# Patient Record
Sex: MALE | URBAN - METROPOLITAN AREA
[De-identification: names, ages, dates, MRNs, and addresses within clinical notes are randomized per-mention and may not be internally consistent; named-entity substitution may affect disease eponyms.]

---

## 2020-03-11 ENCOUNTER — HOSPITAL ENCOUNTER (OUTPATIENT)
Age: 60
Setting detail: SPECIMEN
Discharge: HOME | End: 2020-03-11
Payer: COMMERCIAL

## 2020-03-16 LAB — SURGICAL PATHOLOGY REPORT: NORMAL

## 2024-11-15 ENCOUNTER — HOSPITAL ENCOUNTER (OUTPATIENT)
Age: 64
Setting detail: OUTPATIENT SURGERY
Discharge: HOME | End: 2024-11-15
Attending: INTERNAL MEDICINE | Admitting: INTERNAL MEDICINE
Payer: MEDICARE

## 2024-11-15 VITALS
RESPIRATION RATE: 14 BRPM | HEART RATE: 70 BPM | TEMPERATURE: 97.8 F | SYSTOLIC BLOOD PRESSURE: 118 MMHG | DIASTOLIC BLOOD PRESSURE: 56 MMHG | OXYGEN SATURATION: 97 % | WEIGHT: 223 LBS | HEIGHT: 71 IN | BODY MASS INDEX: 31.22 KG/M2

## 2024-11-15 DIAGNOSIS — I49.3 FREQUENT PVCS: ICD-10-CM

## 2024-11-15 DIAGNOSIS — I49.3 PREMATURE VENTRICULAR CONTRACTIONS: Primary | ICD-10-CM

## 2024-11-15 LAB — ECHO BSA: 2.25 M2

## 2024-11-15 PROCEDURE — 93005 ELECTROCARDIOGRAM TRACING: CPT | Performed by: INTERNAL MEDICINE

## 2024-11-15 PROCEDURE — 2580000003 HC RX 258: Performed by: INTERNAL MEDICINE

## 2024-11-15 PROCEDURE — 7100000010 HC PHASE II RECOVERY - FIRST 15 MIN: Performed by: INTERNAL MEDICINE

## 2024-11-15 PROCEDURE — 99153 MOD SED SAME PHYS/QHP EA: CPT | Performed by: INTERNAL MEDICINE

## 2024-11-15 PROCEDURE — 2709999900 HC NON-CHARGEABLE SUPPLY: Performed by: INTERNAL MEDICINE

## 2024-11-15 PROCEDURE — 2500000003 HC RX 250 WO HCPCS: Performed by: INTERNAL MEDICINE

## 2024-11-15 PROCEDURE — C1732 CATH, EP, DIAG/ABL, 3D/VECT: HCPCS | Performed by: INTERNAL MEDICINE

## 2024-11-15 PROCEDURE — C1766 INTRO/SHEATH,STRBLE,NON-PEEL: HCPCS | Performed by: INTERNAL MEDICINE

## 2024-11-15 PROCEDURE — C1730 CATH, EP, 19 OR FEW ELECT: HCPCS | Performed by: INTERNAL MEDICINE

## 2024-11-15 PROCEDURE — 93654 COMPRE EP EVAL TX VT: CPT | Performed by: INTERNAL MEDICINE

## 2024-11-15 PROCEDURE — C1894 INTRO/SHEATH, NON-LASER: HCPCS | Performed by: INTERNAL MEDICINE

## 2024-11-15 PROCEDURE — 7100000011 HC PHASE II RECOVERY - ADDTL 15 MIN: Performed by: INTERNAL MEDICINE

## 2024-11-15 PROCEDURE — 99152 MOD SED SAME PHYS/QHP 5/>YRS: CPT | Performed by: INTERNAL MEDICINE

## 2024-11-15 PROCEDURE — 6360000002 HC RX W HCPCS: Performed by: INTERNAL MEDICINE

## 2024-11-15 RX ORDER — SODIUM CHLORIDE 9 MG/ML
INJECTION, SOLUTION INTRAVENOUS CONTINUOUS
Status: DISCONTINUED | OUTPATIENT
Start: 2024-11-15 | End: 2024-11-15

## 2024-11-15 RX ORDER — ACETAMINOPHEN 325 MG/1
650 TABLET ORAL EVERY 4 HOURS PRN
Status: DISCONTINUED | OUTPATIENT
Start: 2024-11-15 | End: 2024-11-15 | Stop reason: HOSPADM

## 2024-11-15 RX ORDER — FENTANYL CITRATE 50 UG/ML
INJECTION, SOLUTION INTRAMUSCULAR; INTRAVENOUS PRN
Status: DISCONTINUED | OUTPATIENT
Start: 2024-11-15 | End: 2024-11-15 | Stop reason: HOSPADM

## 2024-11-15 RX ORDER — SODIUM CHLORIDE 0.9 % (FLUSH) 0.9 %
5-40 SYRINGE (ML) INJECTION EVERY 12 HOURS SCHEDULED
Status: DISCONTINUED | OUTPATIENT
Start: 2024-11-15 | End: 2024-11-15 | Stop reason: HOSPADM

## 2024-11-15 RX ORDER — ONDANSETRON 2 MG/ML
4 INJECTION INTRAMUSCULAR; INTRAVENOUS ONCE
Status: COMPLETED | OUTPATIENT
Start: 2024-11-15 | End: 2024-11-15

## 2024-11-15 RX ORDER — SODIUM CHLORIDE 9 MG/ML
INJECTION, SOLUTION INTRAVENOUS PRN
Status: DISCONTINUED | OUTPATIENT
Start: 2024-11-15 | End: 2024-11-15 | Stop reason: HOSPADM

## 2024-11-15 RX ORDER — BUPIVACAINE HYDROCHLORIDE 5 MG/ML
INJECTION, SOLUTION EPIDURAL; INTRACAUDAL PRN
Status: DISCONTINUED | OUTPATIENT
Start: 2024-11-15 | End: 2024-11-15 | Stop reason: HOSPADM

## 2024-11-15 RX ORDER — MIDAZOLAM HYDROCHLORIDE 1 MG/ML
INJECTION, SOLUTION INTRAMUSCULAR; INTRAVENOUS PRN
Status: DISCONTINUED | OUTPATIENT
Start: 2024-11-15 | End: 2024-11-15 | Stop reason: HOSPADM

## 2024-11-15 RX ORDER — SODIUM CHLORIDE 0.9 % (FLUSH) 0.9 %
5-40 SYRINGE (ML) INJECTION PRN
Status: DISCONTINUED | OUTPATIENT
Start: 2024-11-15 | End: 2024-11-15 | Stop reason: HOSPADM

## 2024-11-15 RX ORDER — OXYCODONE HYDROCHLORIDE 5 MG/1
5 TABLET ORAL EVERY 4 HOURS PRN
Status: DISCONTINUED | OUTPATIENT
Start: 2024-11-15 | End: 2024-11-15 | Stop reason: HOSPADM

## 2024-11-15 RX ORDER — METOPROLOL SUCCINATE 25 MG/1
25 TABLET, EXTENDED RELEASE ORAL DAILY
Qty: 30 TABLET | Refills: 3 | Status: SHIPPED | OUTPATIENT
Start: 2024-11-15

## 2024-11-15 RX ADMIN — ONDANSETRON 4 MG: 2 INJECTION INTRAMUSCULAR; INTRAVENOUS at 13:44

## 2024-11-15 RX ADMIN — SODIUM CHLORIDE: 9 INJECTION, SOLUTION INTRAVENOUS at 07:49

## 2024-11-15 ASSESSMENT — PULMONARY FUNCTION TESTS
PIF_VALUE: 0

## 2024-11-15 NOTE — PROGRESS NOTES
Patient admitted, consent signed and questions answered. Patient ready for procedure. Call light to reach with side rails up 2 of 2. Bilat groin hair clipped with writer and Tuyet present.  Family at bedside with patient.  History and physical needs completed.

## 2024-11-15 NOTE — PROCEDURES
metoprolol/diltiazem/digoxin 1 week ago next.      PROCEDURE AND FINDINGS:  The patient was brought to the EP laboratory in the post absorptive state.  The patient was then prepared and draped in routine sterile fashion.  Local anesthesia was provided with 1% lidocaine injection.  Modified Seldinger access was obtained at the right common femoral vein with three separate venipunctures.  A short 7 Surinamese and short 9 Surinamese sheath were placed into the right common femoral vein in over-the-wire fashion.  Next modified Seldinger access was obtained of the right common femoral artery with a single vena puncture.  A short 5 Surinamese sheath was placed in over-the-wire fashion    CATHETERS USED FOR THE PROCEDURE:    A 7 Surinamese Xeleratedter  CS D-F decapolar catheter was positioned within the distal coronary sinus.    A 7 Surinamese Smart Touch D-F irrigated ablation catheter was positioned in the right ventricle for mapping and ablation.    Adequate pacing thresholds were confirmed for all catheters.      PVC/VT MORPHOLOGY:    The initial rhythm was sinus with rare PVCs.  With isoproterenol provocation of 2 mcg/min there were frequent PVCs present    PVC morphology is:   Left bundle branch block, V4 precordial transition,  QS in V1, rS in V2-V3, R in V4-V6  Left inferior axis with R in leads I, II, aVF.  Rs in III. Small amplitude R in aVL, QS in aVR   msec    Initial conduction intervals  , QRS 86, , , , HV 48        ABLATION PORTION OF THE PROCEDURE:    The Carto 3D electroanatomic mapping system was used to construct a rudimentary structure of the His bundle and right ventricle.  A local activation (LAT) map was created of the PVC.  The short 9 Surinamese sheath was exchanged in over-the-wire fashion for an 8.5 Surinamese Agilis medium curve deflectable sheath for improved catheter stability.  Earliest activation was mapped to the basal RV septum and the Para-Hisian region.  Earliest activation was

## 2024-11-15 NOTE — PROGRESS NOTES
Received post procedure to [facility] to room 4. Assessment obtained. Restrictions reviewed with patient. Post procedure pathway initiated.  Rt groin site soft, dressing dry and intact.  No hematoma noted. Wife at side.  Patient without complaints. Head of bed flat with rt leg straight. Peripheral pulses palpable.

## 2024-11-15 NOTE — PROGRESS NOTES
Figure 8 stitch removed.. No s/s of hematoma.Bandaid applied.EKG done. Patient sat up in bed.Peripheral pulses palpable.Wife at bedside.

## 2024-11-15 NOTE — H&P
EP BRIEF H&P    CHIEF COMPLAINT: Ablation    HISTORY OF PRESENT ILLNESS: 63-year-old man with COPD, CPAP, multiple spinal surgeries, stage III CKD bioprosthetic MVR 8/23/2018, and symptomatic PVCs.  He has 1% burden on Holter monitor despite multiple cardiac agents.    N.p.o. after midnight.  Last took amiodarone 1 month ago.  Last took metoprolol/diltiazem/digoxin 1 week ago next      PAST MEDICAL HISTORY  Past Medical History:   Diagnosis Date    COPD (chronic obstructive pulmonary disease) (HCC)     Degenerative disorder of bone     Hypertension     Migraines     Sleep apnea        SURGICAL HISTORY   has a past surgical history that includes Mitral valve replacement; ablation of dysrhythmic focus (11/15/2024); back surgery; cervical fusion; Ankle fracture surgery (Right, 2022); sinus surgery; and Inguinal hernia repair (Right).     SOCIAL HISTORY  Social History     Socioeconomic History    Marital status:      Spouse name: Not on file    Number of children: Not on file    Years of education: Not on file    Highest education level: Not on file   Occupational History    Not on file   Tobacco Use    Smoking status: Former     Types: Cigarettes    Smokeless tobacco: Never   Substance and Sexual Activity    Alcohol use: Not on file     Comment: rarely    Drug use: Never    Sexual activity: Not on file   Other Topics Concern    Not on file   Social History Narrative    Not on file     Social Determinants of Health     Financial Resource Strain: Low Risk  (7/16/2024)    Received from [facility]    Overall Financial Resource Strain (CARDIA)     Difficulty of Paying Living Expenses: Not hard at all   Food Insecurity: No Food Insecurity (10/2/2024)    Received from [facility] [facility]    Hunger Screening     Within the past 12 months we worried whether our food would run out before we got money to buy more.: Never True     Within the past 12 months the food we bought just didn't last and we  didn't have money to get more.: Never True   Transportation Needs: No Transportation Needs (7/16/2024)    Received from [facility]    PRAPARE - Transportation     Lack of Transportation (Medical): No     Lack of Transportation (Non-Medical): No   Physical Activity: Not on file   Stress: Not on file   Social Connections: Not on file   Intimate Partner Violence: Not on file   Housing Stability: Low Risk  (7/16/2024)    Received from [facility]    Housing Instability     Are you worried or concerned that in the next two months you may not have stable housing that you own, rent or stay in as a part of a household?: No       FAMILY HISTORY  family history is not on file.     MEDICATIONS  Prior to Admission medications    Medication Sig Start Date End Date Taking? Authorizing Provider   dilTIAZem (CARDIZEM CD) 240 MG extended release capsule Take 1 capsule by mouth daily   Yes Brandin Clarke MD   amiodarone (PACERONE) 100 MG tablet Take 2 tablets by mouth daily 200 mg every other   Yes Brandin Clarke MD   metoprolol succinate (TOPROL XL) 100 MG extended release tablet Take 25 mg by mouth daily   Yes Brandin Clarke MD   pantoprazole (PROTONIX) 20 MG tablet Take 2 tablets by mouth daily   Yes Brandin Clarke MD   magnesium oxide (MAG-OX) 400 MG tablet Take 1 tablet by mouth 2 times daily   Yes Brandin Clarke MD   atorvastatin (LIPITOR) 20 MG tablet Take 2 tablets by mouth nightly   Yes Brandin Clarke MD   digoxin (LANOXIN) 125 MCG tablet Take 1 tablet by mouth daily 5 days a week   Yes Brandin Clarke MD   montelukast (SINGULAIR) 10 MG tablet Take 1 tablet by mouth nightly   Yes ProviderBrandin MD   tamsulosin (FLOMAX) 0.4 MG capsule Take 1 capsule by mouth daily   Yes Brandin Clarke MD   topiramate (TOPAMAX SPRINKLE) 25 MG capsule Take 1 capsule by mouth 2 times daily   Yes Brandin Clarke MD   Cholecalciferol (VITAMIN D-3) 5000

## 2024-11-15 NOTE — DISCHARGE INSTRUCTIONS
able to return to work.   Avoid heavy lifting objects greater than 8 pounds which is about a full gallon of milk, physically demanding activities, and sexual activity for 5-7 days.   Your activity will also depend upon where the catheter was inserted: Do not sit for long periods of time. Try to change positions frequently.   Medications   If advised by your doctor, resume taking your normal medicines. Use acetaminophen (Tylenol) for pain relief.   Do not take metformin (Glucophage) or glyburide and metformin (Glucovance) for 48 hours after the test.     If you had to stop taking these medications before the procedure, ask your doctor when you can resume taking them:   If youAnti-inflammatory drugs (eg, ibuprofen )   Blood thinners, such as warfarin (Coumadin)   Clopidogrel (Plavix)   If you are taking medicines, follow these general guidelines:   Take your medicine as directed. Do not change the amount or the schedule.   Do not stop taking them without talking to your doctor.   Do not share them.   Know what the results and side effects. Report them to your doctor.   Some drugs can be dangerous when mixed. Talk to a doctor or pharmacist if you are taking more than one drug. This includes over-the-counter medicine and herb or dietary supplements.   Plan ahead for refills so you don't run out.       Call Your Doctor If Any of the Following Occurs   :   Signs of infection, including fever and chills   Redness, swelling, increasing pain, excessive bleeding, or any discharge from the catheter insertion site   CALL 911 if you have symptoms including:   Drooping facial muscles   Changes in vision or speech   Difficulty walking or using your limbs   Change in sensation to affected leg, including numbness, feeling cold, or change in color   Extreme sweating, nausea or vomiting   Dizziness or lightheadedness   Chest pain   Rapid, irregular heartbeat   Palpitations   Cough, shortness of breath, or difficulty breathing

## 2024-11-15 NOTE — PROGRESS NOTES
All discharge instructions reviewed with wife and patient, questions answered.  Patient discharged per w/c with writer, wife, and belongings.Patient ambulated in [place] to bathroom. Rt femoral bandaid dry and intact. No s/s of hematoma. No c/o voiced.

## 2024-11-16 LAB
EKG ATRIAL RATE: 71 BPM
EKG P AXIS: 47 DEGREES
EKG P-R INTERVAL: 260 MS
EKG Q-T INTERVAL: 380 MS
EKG QRS DURATION: 72 MS
EKG QTC CALCULATION (BAZETT): 412 MS
EKG R AXIS: 51 DEGREES
EKG T AXIS: 37 DEGREES
EKG VENTRICULAR RATE: 71 BPM

## 2024-11-16 PROCEDURE — 93010 ELECTROCARDIOGRAM REPORT: CPT | Performed by: INTERNAL MEDICINE

## (undated) DEVICE — CATHETER EP 7FR L115CM 2-8-2MM SPC TIP 2MM DF CRV ADV COMPR

## (undated) DEVICE — INTRODUCER SHTH AD L11CM DIA 9FR STD BLK S STL PERIPH BRACH

## (undated) DEVICE — SUTURE ETHIBOND EXCEL SZ 0 L30IN NONABSORBABLE GRN CT1 L36MM X424H

## (undated) DEVICE — DRAPE EP LT SUBCLAV ENTRY SHLD SORBX

## (undated) DEVICE — INTRODUCER SHTH 5FR CANN L11CM GWIRE 0.038IN STD KINK

## (undated) DEVICE — CATHETER ABLATN D-F 1-6-2 MM 3.5 MM 8.5 FRX115 CM SMARTTOUCH

## (undated) DEVICE — STRAP ARMBRD W1.5XL32IN FOAM STR YET SFT W/ HK AND LOOP

## (undated) DEVICE — TUBING PMP FOR CARTO SYS SMARTABLATE

## (undated) DEVICE — INTRODUCER SHTH 7FR CANN L11CM 0.038IN STD ORNG W/ MINI

## (undated) DEVICE — SHEATH INTRO 8.5FR L71CM 8.5FR DIL GWIRE L180CM DIA0.032IN